# Patient Record
Sex: FEMALE | Race: WHITE | NOT HISPANIC OR LATINO | URBAN - METROPOLITAN AREA
[De-identification: names, ages, dates, MRNs, and addresses within clinical notes are randomized per-mention and may not be internally consistent; named-entity substitution may affect disease eponyms.]

---

## 2022-04-05 ENCOUNTER — APPOINTMENT (RX ONLY)
Dept: URBAN - METROPOLITAN AREA CLINIC 139 | Facility: CLINIC | Age: 57
Setting detail: DERMATOLOGY
End: 2022-04-05

## 2022-04-05 DIAGNOSIS — Z41.9 ENCOUNTER FOR PROCEDURE FOR PURPOSES OTHER THAN REMEDYING HEALTH STATE, UNSPECIFIED: ICD-10-CM

## 2022-04-05 PROCEDURE — ? COSMETIC CONSULTATION: SKIN CARE PRODUCTS AND SERVICES

## 2022-04-05 NOTE — HPI: COSMETIC CONSULTATION
When Outside In The Sun, Do You...: mostly burns, rarely tans
Additional History: She states she is here as her daughter is getting  in July and she wants to try to improve the look of her skin as much as possible before the wedding. She states she was referred to me by Dr. Erickson. She states she is most concerned about her aging skin and wants it to look fresher and if there is anything to help her wrinkles. She has significant wrinkling on her forehead, above her upper lip and the sides of her mouth. She states she was a former smoker. She states her sister did a peel and she thought it really helped but she wanted my advice.  She is from [place] and doesn’t have a lot of sun damage.

## 2022-04-14 ENCOUNTER — RX ONLY (OUTPATIENT)
Age: 57
Setting detail: RX ONLY
End: 2022-04-14

## 2022-04-14 ENCOUNTER — APPOINTMENT (RX ONLY)
Dept: URBAN - METROPOLITAN AREA CLINIC 139 | Facility: CLINIC | Age: 57
Setting detail: DERMATOLOGY
End: 2022-04-14

## 2022-04-14 DIAGNOSIS — Z41.9 ENCOUNTER FOR PROCEDURE FOR PURPOSES OTHER THAN REMEDYING HEALTH STATE, UNSPECIFIED: ICD-10-CM

## 2022-04-14 PROCEDURE — ? VI PEEL

## 2022-04-14 PROCEDURE — ? PRODUCT LINE (OFFICE PRODUCTS)

## 2022-04-14 PROCEDURE — ? COSMETIC CONSULTATION: SKIN CARE PRODUCTS AND SERVICES

## 2022-04-14 RX ORDER — TRETIONIN 0.5 MG/G
CREAM TOPICAL
Qty: 20 | Refills: 3 | Status: CANCELLED
Stop reason: CLARIF

## 2022-04-14 ASSESSMENT — LOCATION DETAILED DESCRIPTION DERM
LOCATION DETAILED: RIGHT INFERIOR CENTRAL MALAR CHEEK
LOCATION DETAILED: RIGHT MEDIAL FOREHEAD
LOCATION DETAILED: LEFT INFERIOR MEDIAL MALAR CHEEK
LOCATION DETAILED: LEFT CHIN

## 2022-04-14 ASSESSMENT — LOCATION SIMPLE DESCRIPTION DERM
LOCATION SIMPLE: LEFT CHEEK
LOCATION SIMPLE: RIGHT CHEEK
LOCATION SIMPLE: CHIN
LOCATION SIMPLE: RIGHT FOREHEAD

## 2022-04-14 ASSESSMENT — LOCATION ZONE DERM: LOCATION ZONE: FACE

## 2022-04-14 NOTE — HPI: COSMETIC (CHEMICAL PEEL)
Have You Had A Chemical Peel Before?: has not had a previous peel
When Outside In The Sun, Do You...: mostly burns, rarely tans
Additional History: She was here last week and we planned to do a chemical peel but she was going to [place] and wanted to wait as she would be out in the sun and peeling so she returned today to have the peel Her daughter is getting  in July and she wants her skin to look it’s best. \\nSmarck has some light sun spots over most of her face but also some fine lines and wrinkles.

## 2022-04-14 NOTE — PROCEDURE: PRODUCT LINE (OFFICE PRODUCTS)
Product 2 Units: 0
Product 5 Price (In Dollars - Numeric Only, No Special Characters Or $): 35.00
Assigning Risk Information: Per AMA, level of risk is based upon consequences of the problem(s) addressed at the encounter when appropriately treated. Risk also includes medical decision making related to the need to initiate or forego further testing, treatment and/or hospitalization. Over the counter medication are assigned a risk level of low. Prescription medication management is assigned a risk level of moderate.
Product 67 Price (In Dollars - Numeric Only, No Special Characters Or $): 0.00
Product 7 Application Directions: Apply a pea size to a clean face at bedtime.
Product 2 Application Directions: Apply a thin layer to scar am and pm
Risk Of Complication Category: Moderate (Prescription Medication Management)
Name Of Product 8: Tretinoin Taro
Product 8 Price (In Dollars - Numeric Only, No Special Characters Or $): 70.00
Allow Plan To Count Towards E/M Coding: Yes
Product 5 Application Directions: Reapply every two hours to skin when in the sun.
Name Of Product 3: Elta MD UV Clear Broad Spectrum SPF 46
Product 3 Price (In Dollars - Numeric Only, No Special Characters Or $): 32.00
Product 8 Application Directions: Apply a pea size to a clean face at bedtime. May blend with hydroquinone.
Name Of Product 4: Tretinoin 0.05% cream
Product 6 Application Directions: Apply every 2 hours when exposed to sun.
Product 4 Price (In Dollars - Numeric Only, No Special Characters Or $): 91.00
Product 1 Application Directions: Apply one drop using brush included on the kit to the eyelash line taking care not to get the product into the eye.
Name Of Product 1: Latisse
Name Of Product 7: Tretinoin cream 1% - 20 gm tube
Product 4 Units: 1
Product 1 Price (In Dollars - Numeric Only, No Special Characters Or $): 175.00
Name Of Product 2: Winsomeigel
Name Of Product 5: Elta MD UV Clear Broad Spectrum SPF 46 Tinted
Detail Level: Zone
Product 2 Price (In Dollars - Numeric Only, No Special Characters Or $): 46.00

## 2022-04-14 NOTE — PROCEDURE: VI PEEL
Chemical Peel: VI Peel Advanced
Prep: The treated area was degreased with pre-peel acetone pad cleanser wipe and vaseline was applied for protection of mucous membranes.
Post Peel Care: After the procedure, the patient was instructed not to wash the treated area for 4 hours or manually remove dead skin when the peeling process starts. Patient was instructed verbally and given the supplied written instructions on the how to use the post peel home kit to the treated area on the 1st and 2nd nights and how to care for their skin after that time.  The patient downloaded the VI Peel application on their phone to help answer any questions and to guide them on the home care process.
Price (Use Numbers Only, No Special Characters Or $): 419.00
Consent: Prior to the procedure, written consent was obtained and risks were reviewed, including but not limited to: redness, peeling, blistering, pigmentary change, scarring, infection, and pain. Patient is aware multiple treatments may be necessary to achieve the desired outcome.
Post-Care Instructions: I reviewed with the patient in detail post-care instructions and the patient downloaded the VI Peel caitlyn on their phone. The patient was given the post care kit included with the peel with detailed  instructions on how to perform each step and day and times to perform them on. The patient was instructed to to peel or peel off skin that has not yet peeled and to avoid sun exposure and wear sun protection as instructed.
Detail Level: Zone
Treatment Number: 1